# Patient Record
Sex: MALE | Race: WHITE | NOT HISPANIC OR LATINO | Employment: FULL TIME | URBAN - METROPOLITAN AREA
[De-identification: names, ages, dates, MRNs, and addresses within clinical notes are randomized per-mention and may not be internally consistent; named-entity substitution may affect disease eponyms.]

---

## 2018-09-26 ENCOUNTER — TRANSCRIBE ORDERS (OUTPATIENT)
Dept: ADMINISTRATIVE | Age: 33
End: 2018-09-26

## 2018-09-26 ENCOUNTER — APPOINTMENT (OUTPATIENT)
Dept: LAB | Age: 33
End: 2018-09-26
Attending: PREVENTIVE MEDICINE

## 2018-09-26 DIAGNOSIS — Z01.84 IMMUNITY STATUS TESTING: ICD-10-CM

## 2018-09-26 DIAGNOSIS — Z01.84 IMMUNITY STATUS TESTING: Primary | ICD-10-CM

## 2018-09-26 PROCEDURE — 86480 TB TEST CELL IMMUN MEASURE: CPT

## 2018-09-26 PROCEDURE — 86735 MUMPS ANTIBODY: CPT

## 2018-09-26 PROCEDURE — 86787 VARICELLA-ZOSTER ANTIBODY: CPT

## 2018-09-26 PROCEDURE — 36415 COLL VENOUS BLD VENIPUNCTURE: CPT

## 2018-09-26 PROCEDURE — 86765 RUBEOLA ANTIBODY: CPT

## 2018-09-26 PROCEDURE — 86762 RUBELLA ANTIBODY: CPT

## 2018-09-27 LAB
MEV IGG SER QL: ABNORMAL
MUV IGG SER QL: NORMAL
RUBV IGG SERPL IA-ACNC: 10.1 IU/ML
VZV IGG SER IA-ACNC: NORMAL

## 2018-09-28 LAB
GAMMA INTERFERON BACKGROUND BLD IA-ACNC: 0.04 IU/ML
M TB IFN-G BLD-IMP: NEGATIVE
M TB IFN-G CD4+ BCKGRND COR BLD-ACNC: -0.01 IU/ML
M TB IFN-G CD4+ BCKGRND COR BLD-ACNC: 0 IU/ML
MITOGEN IGNF BCKGRD COR BLD-ACNC: 3.08 IU/ML

## 2020-03-25 ENCOUNTER — NURSE TRIAGE (OUTPATIENT)
Dept: OTHER | Facility: OTHER | Age: 35
End: 2020-03-25

## 2020-03-25 DIAGNOSIS — Z20.828 EXPOSURE TO SARS-ASSOCIATED CORONAVIRUS: Primary | ICD-10-CM

## 2020-03-25 DIAGNOSIS — Z20.828 SARS-ASSOCIATED CORONAVIRUS EXPOSURE: ICD-10-CM

## 2020-03-25 NOTE — TELEPHONE ENCOUNTER
Regarding: Genove[place]  ----- Message from Gasper Dooley sent at 3/25/2020  6:37 PM EDT -----  Fever: 99 5 a couple minutes ago, highest was about an hour and a half ago 100 7  SOB: No  Cough: Yes  Symptoms started Monday with body aches, sore throat    Recent travel outside the country: No  Exposed to anyone positive for coronavirus: No    Pt works in the [employer] [facility]

## 2020-03-25 NOTE — TELEPHONE ENCOUNTER
Spoke to Kev  Approx 5pm today temp was 100 7, took ibuprofen and temp now 99 5  Occasional cough since Monday also has had body aches and fatigue   in [employer]  Did report to his manager  Self quarantine until  Tested  Aware I can not order testing at this time due to testing sites being closed  I recommended he do a video visit so an order can be placed for him to be tested first thing in the morning    Pt agreeable    Reason for Disposition   [1] COVID-19 EXPOSURE within last 14 days AND [2] mild body aches, chills, diarrhea, headache, runny nose, or sore throat occur     Unknown exposure    Answer Assessment - Initial Assessment Questions  1  CONFIRMED CASE: "Who is the person with the confirmed COVID-19 infection that you were exposed to?"      n/a  2  PLACE of CONTACT: "Where were you when you were exposed to COVID-19  (coronavirus disease 2019)?" (e g , city, state, country)      Works in healthcare facility  3  TYPE of CONTACT: "How much contact was there?" (e g , live in same house, work in same office, same school)      unknown  4  DATE of CONTACT: "When did you have contact with a coronavirus patient?" (e g , days)      possibly workplace  5  DURATION of CONTACT: "How long were you in contact with the COVID-19 (coronavirus disease) patient?" (e g , a few seconds, passed by person, a few minutes, live with the patient)      unknown  6  SYMPTOMS: "Do you have any symptoms?" (e g , fever, cough, breathing difficulty)      Dry cough, bodyaches and fatigue  Temp 100 7  7  PREGNANCY OR POSTPARTUM: "Is there any chance you are pregnant?" "When was your last menstrual period?" "Did you deliver in the last 2 weeks?"      n/a  8  HIGH RISK: "Do you have any heart or lung problems?  Do you have a weakened immune system?" (e g , CHF, COPD, asthma, HIV positive, chemotherapy, renal failure, diabetes mellitus, sickle cell anemia)      no    Protocols used: CORONAVIRUS (COVID-19) EXPOSURE-ADULT-AH

## 2020-03-26 ENCOUNTER — NURSE TRIAGE (OUTPATIENT)
Dept: OTHER | Facility: OTHER | Age: 35
End: 2020-03-26

## 2020-03-26 DIAGNOSIS — Z20.828 EXPOSURE TO SARS-ASSOCIATED CORONAVIRUS: ICD-10-CM

## 2020-03-26 PROCEDURE — 87635 SARS-COV-2 COVID-19 AMP PRB: CPT

## 2020-03-26 NOTE — TELEPHONE ENCOUNTER
Regarding: Covid testing  ----- Message from Srinivasan Veliz sent at 3/26/2020 10:16 AM EDT -----  Covid testing order placed with wrong demographics

## 2020-03-26 NOTE — TELEPHONE ENCOUNTER
Reason for Disposition   Information only question and nurse able to answer    Protocols used: INFORMATION ONLY CALL - NO TRIAGE-ADULT-[place]

## 2020-03-26 NOTE — TELEPHONE ENCOUNTER
Pt was unable to obtain testing Rx last evening and is a [employer] employee   Testing ordered today

## 2020-03-26 NOTE — TELEPHONE ENCOUNTER
MD notified me that he signed the order  I notified the patient that the MD signed the order and it was received at the testing site

## 2020-03-26 NOTE — TELEPHONE ENCOUNTER
Patient called because his order for testing had his name spelled incorrectly (Latallo, instead of Lapallo) and the staff at the testing site would not test him  I placed a new order  Paged Dr Matt Giraldo via TT in [address] [place] [address] Rd @ [phone]  Jamr Overcast Lapallo/1985  Patient is at testing site  Order was put in earlier with wrong spelling of the name  Please sign asap and let me know so that I can notify the patient

## 2020-03-31 LAB — SARS-COV-2 RNA SPEC QL NAA+PROBE: NOT DETECTED
